# Patient Record
Sex: MALE | URBAN - METROPOLITAN AREA
[De-identification: names, ages, dates, MRNs, and addresses within clinical notes are randomized per-mention and may not be internally consistent; named-entity substitution may affect disease eponyms.]

---

## 2021-09-01 ENCOUNTER — OFFICE VISIT (OUTPATIENT)
Dept: URBAN - METROPOLITAN AREA CLINIC 60 | Facility: CLINIC | Age: 46
End: 2021-09-01
Payer: COMMERCIAL

## 2021-09-01 DIAGNOSIS — H04.123 TEAR FILM INSUFFICIENCY OF BILATERAL LACRIMAL GLANDS: Primary | ICD-10-CM

## 2021-09-01 PROCEDURE — 99203 OFFICE O/P NEW LOW 30 MIN: CPT | Performed by: OPTOMETRIST

## 2021-09-01 NOTE — IMPRESSION/PLAN
Impression: Tear film insufficiency of bilateral lacrimal glands: H04.123. Plan: Patient educated on findings. Lissamine green staining performed today. Patient to begin artificial tears 4-6 times per day OU everyday and ointment at night. Dry eye handout given to patient. Patient to discontinue sleeping with fan directly on him at night. Humidifier discussed with patient. Patient to try this for a few months to see if there is any improvement. Discussed future options Xiidra, Restasis, Cequa or punctal plugs. Patient is a current contact lens wearer. Patient educated on multipurpose solutions and contacts contributing to dry/red eyes. Recommend patient switching to Clear Care and possibly switching to daily contacts lenses.